# Patient Record
Sex: FEMALE | Race: WHITE | NOT HISPANIC OR LATINO
[De-identification: names, ages, dates, MRNs, and addresses within clinical notes are randomized per-mention and may not be internally consistent; named-entity substitution may affect disease eponyms.]

---

## 2017-02-07 ENCOUNTER — RX RENEWAL (OUTPATIENT)
Age: 42
End: 2017-02-07

## 2017-04-18 ENCOUNTER — OTHER (OUTPATIENT)
Age: 42
End: 2017-04-18

## 2017-05-01 ENCOUNTER — RX RENEWAL (OUTPATIENT)
Age: 42
End: 2017-05-01

## 2017-07-11 ENCOUNTER — RX RENEWAL (OUTPATIENT)
Age: 42
End: 2017-07-11

## 2017-09-18 ENCOUNTER — RX RENEWAL (OUTPATIENT)
Age: 42
End: 2017-09-18

## 2017-11-16 ENCOUNTER — APPOINTMENT (OUTPATIENT)
Dept: OBGYN | Facility: CLINIC | Age: 42
End: 2017-11-16

## 2017-12-11 ENCOUNTER — RX RENEWAL (OUTPATIENT)
Age: 42
End: 2017-12-11

## 2018-01-08 ENCOUNTER — RX RENEWAL (OUTPATIENT)
Age: 43
End: 2018-01-08

## 2018-02-23 ENCOUNTER — APPOINTMENT (OUTPATIENT)
Dept: OBGYN | Facility: CLINIC | Age: 43
End: 2018-02-23
Payer: COMMERCIAL

## 2018-02-23 VITALS
DIASTOLIC BLOOD PRESSURE: 80 MMHG | WEIGHT: 210 LBS | HEIGHT: 62 IN | BODY MASS INDEX: 38.64 KG/M2 | SYSTOLIC BLOOD PRESSURE: 130 MMHG

## 2018-02-23 PROCEDURE — 99396 PREV VISIT EST AGE 40-64: CPT

## 2018-02-25 LAB — HPV HIGH+LOW RISK DNA PNL CVX: NOT DETECTED

## 2018-03-01 LAB — CYTOLOGY CVX/VAG DOC THIN PREP: NORMAL

## 2019-03-23 ENCOUNTER — EMERGENCY (EMERGENCY)
Facility: HOSPITAL | Age: 44
LOS: 1 days | Discharge: HOME | End: 2019-03-23
Attending: EMERGENCY MEDICINE | Admitting: EMERGENCY MEDICINE
Payer: COMMERCIAL

## 2019-03-23 VITALS
TEMPERATURE: 98 F | WEIGHT: 136.03 LBS | HEART RATE: 100 BPM | RESPIRATION RATE: 17 BRPM | HEIGHT: 62 IN | OXYGEN SATURATION: 100 % | DIASTOLIC BLOOD PRESSURE: 76 MMHG | SYSTOLIC BLOOD PRESSURE: 120 MMHG

## 2019-03-23 VITALS
RESPIRATION RATE: 14 BRPM | DIASTOLIC BLOOD PRESSURE: 76 MMHG | HEART RATE: 89 BPM | TEMPERATURE: 98 F | OXYGEN SATURATION: 100 % | SYSTOLIC BLOOD PRESSURE: 111 MMHG

## 2019-03-23 DIAGNOSIS — F32.81 PREMENSTRUAL DYSPHORIC DISORDER: ICD-10-CM

## 2019-03-23 DIAGNOSIS — F41.9 ANXIETY DISORDER, UNSPECIFIED: ICD-10-CM

## 2019-03-23 LAB
ANION GAP SERPL CALC-SCNC: 11 MMOL/L — SIGNIFICANT CHANGE UP (ref 5–17)
APPEARANCE UR: CLEAR — SIGNIFICANT CHANGE UP
APPEARANCE UR: CLEAR — SIGNIFICANT CHANGE UP
BASOPHILS # BLD AUTO: 0.03 K/UL — SIGNIFICANT CHANGE UP (ref 0–0.2)
BASOPHILS NFR BLD AUTO: 0.5 % — SIGNIFICANT CHANGE UP (ref 0–2)
BILIRUB UR-MCNC: NEGATIVE — SIGNIFICANT CHANGE UP
BILIRUB UR-MCNC: NEGATIVE — SIGNIFICANT CHANGE UP
BUN SERPL-MCNC: 11 MG/DL — SIGNIFICANT CHANGE UP (ref 7–23)
CALCIUM SERPL-MCNC: 9.3 MG/DL — SIGNIFICANT CHANGE UP (ref 8.4–10.5)
CHLORIDE SERPL-SCNC: 104 MMOL/L — SIGNIFICANT CHANGE UP (ref 96–108)
CO2 SERPL-SCNC: 26 MMOL/L — SIGNIFICANT CHANGE UP (ref 22–31)
COLOR SPEC: SIGNIFICANT CHANGE UP
COLOR SPEC: YELLOW — SIGNIFICANT CHANGE UP
CREAT SERPL-MCNC: 0.91 MG/DL — SIGNIFICANT CHANGE UP (ref 0.5–1.3)
DIFF PNL FLD: ABNORMAL
DIFF PNL FLD: NEGATIVE — SIGNIFICANT CHANGE UP
EOSINOPHIL # BLD AUTO: 0.02 K/UL — SIGNIFICANT CHANGE UP (ref 0–0.5)
EOSINOPHIL NFR BLD AUTO: 0.3 % — SIGNIFICANT CHANGE UP (ref 0–6)
GLUCOSE SERPL-MCNC: 113 MG/DL — HIGH (ref 70–99)
GLUCOSE UR QL: NEGATIVE MG/DL — SIGNIFICANT CHANGE UP
GLUCOSE UR QL: NEGATIVE MG/DL — SIGNIFICANT CHANGE UP
HCT VFR BLD CALC: 39.6 % — SIGNIFICANT CHANGE UP (ref 34.5–45)
HGB BLD-MCNC: 13.9 G/DL — SIGNIFICANT CHANGE UP (ref 11.5–15.5)
IMM GRANULOCYTES NFR BLD AUTO: 0.2 % — SIGNIFICANT CHANGE UP (ref 0–1.5)
KETONES UR-MCNC: ABNORMAL
KETONES UR-MCNC: ABNORMAL
LEUKOCYTE ESTERASE UR-ACNC: ABNORMAL
LEUKOCYTE ESTERASE UR-ACNC: ABNORMAL
LYMPHOCYTES # BLD AUTO: 1.07 K/UL — SIGNIFICANT CHANGE UP (ref 1–3.3)
LYMPHOCYTES # BLD AUTO: 16.8 % — SIGNIFICANT CHANGE UP (ref 13–44)
MCHC RBC-ENTMCNC: 32.3 PG — SIGNIFICANT CHANGE UP (ref 27–34)
MCHC RBC-ENTMCNC: 35.1 GM/DL — SIGNIFICANT CHANGE UP (ref 32–36)
MCV RBC AUTO: 92.1 FL — SIGNIFICANT CHANGE UP (ref 80–100)
MONOCYTES # BLD AUTO: 0.51 K/UL — SIGNIFICANT CHANGE UP (ref 0–0.9)
MONOCYTES NFR BLD AUTO: 8 % — SIGNIFICANT CHANGE UP (ref 2–14)
NEUTROPHILS # BLD AUTO: 4.73 K/UL — SIGNIFICANT CHANGE UP (ref 1.8–7.4)
NEUTROPHILS NFR BLD AUTO: 74.2 % — SIGNIFICANT CHANGE UP (ref 43–77)
NITRITE UR-MCNC: NEGATIVE — SIGNIFICANT CHANGE UP
NITRITE UR-MCNC: NEGATIVE — SIGNIFICANT CHANGE UP
NRBC # BLD: 0 /100 WBCS — SIGNIFICANT CHANGE UP (ref 0–0)
PCP SPEC-MCNC: SIGNIFICANT CHANGE UP
PH UR: 5 — SIGNIFICANT CHANGE UP (ref 5–8)
PH UR: 7 — SIGNIFICANT CHANGE UP (ref 5–8)
PLATELET # BLD AUTO: 241 K/UL — SIGNIFICANT CHANGE UP (ref 150–400)
POTASSIUM SERPL-MCNC: 4 MMOL/L — SIGNIFICANT CHANGE UP (ref 3.5–5.3)
POTASSIUM SERPL-SCNC: 4 MMOL/L — SIGNIFICANT CHANGE UP (ref 3.5–5.3)
PROT UR-MCNC: 30 MG/DL
PROT UR-MCNC: NEGATIVE MG/DL — SIGNIFICANT CHANGE UP
RBC # BLD: 4.3 M/UL — SIGNIFICANT CHANGE UP (ref 3.8–5.2)
RBC # FLD: 11.6 % — SIGNIFICANT CHANGE UP (ref 10.3–14.5)
SODIUM SERPL-SCNC: 141 MMOL/L — SIGNIFICANT CHANGE UP (ref 135–145)
SP GR SPEC: 1 — LOW (ref 1.01–1.02)
SP GR SPEC: 1.02 — SIGNIFICANT CHANGE UP (ref 1.01–1.02)
UROBILINOGEN FLD QL: 1 MG/DL
UROBILINOGEN FLD QL: NEGATIVE MG/DL — SIGNIFICANT CHANGE UP
WBC # BLD: 6.37 K/UL — SIGNIFICANT CHANGE UP (ref 3.8–10.5)
WBC # FLD AUTO: 6.37 K/UL — SIGNIFICANT CHANGE UP (ref 3.8–10.5)

## 2019-03-23 PROCEDURE — 87086 URINE CULTURE/COLONY COUNT: CPT

## 2019-03-23 PROCEDURE — 85027 COMPLETE CBC AUTOMATED: CPT

## 2019-03-23 PROCEDURE — 99284 EMERGENCY DEPT VISIT MOD MDM: CPT

## 2019-03-23 PROCEDURE — 80307 DRUG TEST PRSMV CHEM ANLYZR: CPT

## 2019-03-23 PROCEDURE — 93005 ELECTROCARDIOGRAM TRACING: CPT

## 2019-03-23 PROCEDURE — 81001 URINALYSIS AUTO W/SCOPE: CPT

## 2019-03-23 PROCEDURE — 93010 ELECTROCARDIOGRAM REPORT: CPT

## 2019-03-23 PROCEDURE — 82607 VITAMIN B-12: CPT

## 2019-03-23 PROCEDURE — 36415 COLL VENOUS BLD VENIPUNCTURE: CPT

## 2019-03-23 PROCEDURE — 84443 ASSAY THYROID STIM HORMONE: CPT

## 2019-03-23 PROCEDURE — 80048 BASIC METABOLIC PNL TOTAL CA: CPT

## 2019-03-23 PROCEDURE — 99283 EMERGENCY DEPT VISIT LOW MDM: CPT

## 2019-03-23 RX ORDER — ALPRAZOLAM 0.25 MG
1 TABLET ORAL
Qty: 0 | Refills: 0 | COMMUNITY

## 2019-03-23 RX ORDER — FLUOXETINE HCL 10 MG
1 CAPSULE ORAL
Qty: 0 | Refills: 0 | COMMUNITY

## 2019-03-23 RX ADMIN — Medication 1 MILLIGRAM(S): at 11:20

## 2019-03-23 NOTE — ED PROVIDER NOTE - ATTENDING CONTRIBUTION TO CARE
I, Dr Panda, have personally performed a face to face diagnostic evaluation on this patient with the PA/NP. I have reviewed the PA/NP's note and agree with the history, Physical exam and plan of care as per history pt is a 42yo female with pmhx of PMDD and anxiety c/o anxiety x weeks. pt reports increased anxiety and worry for week. pt went to pmd monday, dr craft, who rx prozac and xanax which has provided minimal relief. pt took xanax 2 times yesterday 0.25mg and once today 6am. pt denies fever, cp, sob, palpitations, suicidal ideation, homicidal ideations, hallucinations, delusions. on exam unremakable advice to fallow up with out patient mental clinic.

## 2019-03-23 NOTE — ED PROVIDER NOTE - NSFOLLOWUPINSTRUCTIONS_ED_ALL_ED_FT
1. FOLLOW UP WITH YOUR PRIMARY DOCTOR IN 24-48 HOURS.   2. FOLLOW UP WITH ALL SPECIALIST DISCUSSED DURING YOUR VISIT.   3. TAKE ALL MEDICATIONS PRESCRIBED IN THE ER IF ANY ARE PRESCRIBED. CONTINUE YOUR HOME MEDICATIONS UNLESS OTHERWISE ADVISED DIFFERENTLY.   4. RETURN FOR WORSENING SYMPTOMS OR CONCERNS INCLUDING BUT NOT LIMITED TO FEVER, CHEST PAIN, OR TROUBLE BREATHING OR ANY OTHER CONCERNS  5. continue all home medications

## 2019-03-23 NOTE — ED PROVIDER NOTE - CHPI ED SYMPTOMS NEG
no homicidal/no weakness/no agitation/no disorientation/no hallucinations/no paranoia/no suicidal/no change in level of consciousness

## 2019-03-23 NOTE — ED PROVIDER NOTE - PROGRESS NOTE DETAILS
consulted vaishali at [facility] who advised pt can come to be evaluated during walkin hours tuesday 9-11am and have psych eval. discussed with pt who agrees. will re-eval labs. family requested psych consult. consulted [facility] who advised no need for psych consult at this time. pt is not SI or homicidal. pt advised she can follow up out pt. resources given to pt from [facility] family requested psych consult. consulted [facility] who advised no need for psych consult at this time. pt is not SI or homicidal. pt advised she can follow up out pt. resources given to pt from [facility]. will repeat urine as urine was not clean catch. pt improved. pt advised to follow up with [facility]. ua few wbc, pt without dysuria. will send culture. All imaging and labs reviewed. all results reviewed with pt including abnormal results. pt given a copy of results. pt advised to follow up with pmd regarding abnormal results. All questions answered and concerns addressed. pt verbalized understanding and agreement with plan and dx. pt advised on next step and when/where to follow up. pt advised on all take home and otc medications. pt advised to follow up with PMD. pt advised to return to ed for worsenng symptoms including fever, cp, sob. will dc.

## 2019-03-23 NOTE — ED PROVIDER NOTE - NSFOLLOWUPCLINICS_GEN_ALL_ED_FT
[facility]  Psychiatry  [address] [place] [address]  Phone: [phone]  Fax:   Follow Up Time:     [facility]  Psychiatry  [address] [place] [address]  Phone: [phone]  Fax:   Follow Up Time:

## 2019-03-23 NOTE — ED PROVIDER NOTE - CLINICAL SUMMARY MEDICAL DECISION MAKING FREE TEXT BOX
pt with increased anxiety over weeks, no suicidal or homicidal ideations, no hallucinations or delusions. pt started on xanax and prozac by pmd dr craft for <1 week. will do labs, tsh to r/o hypo/hyperthyroid, b12, ekg, ua, drug screen. pt advised she needs full labs work for vitamin levels, etc by pmd. will give ativan in ed. will re-eval

## 2019-03-23 NOTE — ED PROVIDER NOTE - OBJECTIVE STATEMENT
pt is a 44yo female with pmhx of PMDD and anxiety c/o anxiety x weeks. pt reports increased anxiety and worry for week. pt went to pmd monday, dr craft, who rx prozac and xanax which has provided minimal relief. pt took xanax 2 times yesterday 0.25mg and once today 6am. pt denies fever, cp, sob, palpitations, suicidal ideation, homicidal ideations, hallucinations, delusions.

## 2019-03-23 NOTE — ED ADULT TRIAGE NOTE - CHIEF COMPLAINT QUOTE
wants to talk to psychiatrist  feels depressed or anxious not sure which saw pmd and started on prozac

## 2019-03-23 NOTE — ED ADULT NURSE NOTE - OBJECTIVE STATEMENT
need to talk to a psychiatrist, primary physician sees patient for anxiety prescribed Xanax and Prozac

## 2019-03-24 LAB
CULTURE RESULTS: NO GROWTH — SIGNIFICANT CHANGE UP
SPECIMEN SOURCE: SIGNIFICANT CHANGE UP
TSH SERPL-MCNC: 1.24 UIU/ML — SIGNIFICANT CHANGE UP (ref 0.27–4.2)
VIT B12 SERPL-MCNC: 493 PG/ML — SIGNIFICANT CHANGE UP (ref 232–1245)

## 2019-03-25 PROBLEM — F32.81 PREMENSTRUAL DYSPHORIC DISORDER: Chronic | Status: ACTIVE | Noted: 2019-03-23

## 2019-03-25 PROBLEM — F41.9 ANXIETY DISORDER, UNSPECIFIED: Chronic | Status: ACTIVE | Noted: 2019-03-23

## 2019-03-25 NOTE — ED ADULT NURSE NOTE - NSFALLRSKASSESSDT_ED_ALL_ED
Patient ID: Nena Valenzuela is a 81year old female. Chief Complaint   Patient presents with   â¢ Follow-up       HPI    Takes vasotec twice a day    no chest pain or sob   mild ankle edema on andoff  Knees are stable  Take multivit occ  Only problem is stiff knees  Last eye exam was 2 years ago    PAST MEDICAL HX:    Arthritis                                                     Essential (primary) hypertension                              ALLERGIES:  No Known Allergies    Current Outpatient Medications   Medication Sig Dispense Refill   â¢ hydrochlorothiazide (HYDRODIURIL) 25 MG tablet One tablet per day as needed for ankle swelling 30 tablet 1   â¢ enalapril (VASOTEC) 5 MG tablet Take 1 tablet by mouth every 12 hours. TAKE 1 TABLET Every twelve hours 180 tablet 3     No current facility-administered medications for this visit. PAST SURGICAL HX:    HYSTERECTOMY                                                Comment: fibroids    History reviewed. No pertinent family history. Review of patient's family status indicates: Mother                                             Comment: pneumonia    Father                                             Comment: lived to age 80,alcoholic    Sister                                             Comment: 2  of unknown cause, still 4 sister                alive    Brother                                            Comment: 2  form alcoohol    Daughter                       Alive                       Social History     Socioeconomic History   â¢ Marital status:       Spouse name: Not on file   â¢ Number of children: Not on file   â¢ Years of education: Not on file   â¢ Highest education level: Not on file   Social Needs   â¢ Financial resource strain: Not on file   â¢ Food insecurity - worry: Not on file   â¢ Food insecurity - inability: Not on file   â¢ Transportation needs - medical: Not on file   â¢ Transportation needs - non-medical: Not on file   Occupational History   â¢ Not on file   Tobacco Use   â¢ Smoking status: Never Smoker   â¢ Smokeless tobacco: Never Used   Substance and Sexual Activity   â¢ Alcohol use: No     Frequency: Never   â¢ Drug use: No   â¢ Sexual activity: Not on file   Other Topics Concern   â¢ Not on file   Social History Narrative   â¢ Not on file       Review of Systems   All other systems reviewed and are negative. Physical Exam   Constitutional: She is oriented to person, place, and time. She appears well-developed and well-nourished. Neck: Normal range of motion. Cardiovascular:   Mild ankle edema  And 1 + pedal pulse   Pulmonary/Chest: Effort normal and breath sounds normal.   Musculoskeletal:   Decreased ROM of knees   Neurological: She is alert and oriented to person, place, and time. She has normal reflexes. Skin: Skin is warm and dry.        No results found for: WBC  No results found for: RBC  No results found for: HCT  No results found for: HGB  No results found for: PLT  Sodium (mmol/L)   Date Value   09/15/2018 142     Potassium (mmol/L)   Date Value   09/15/2018 4.2     Chloride (mmol/L)   Date Value   09/15/2018 107     Glucose (mg/dl)   Date Value   09/15/2018 87     CALCIUM (mg/dl)   Date Value   09/15/2018 8.7     Carbon Dioxide (mmol/L)   Date Value   09/15/2018 27     BUN (mg/dl)   Date Value   09/15/2018 9     Creatinine (mg/dl)   Date Value   09/15/2018 0.92     Hemoglobin A1C (%)   Date Value   10/19/2018 5.9 (H)     CHOLESTEROL (mg/dl)   Date Value   09/15/2018 222 (H)     HDL (mg/dl)   Date Value   09/15/2018 64     CHOL/HDL ( )   Date Value   09/15/2018 3.5     TRIGLYCERIDE (mg/dl)   Date Value   09/15/2018 87     CALCULATED LDL (mg/dl)   Date Value   09/15/2018 141 (H)       Health Maintenance Summary     Topic Due On Due Status Completed On    Osteoporosis Screening Feb 10, 2001 [facility] Visit Feb 10, 2001 Overdue     IMMUNIZATION - DTaP/Tdap/Td Feb 10, 1955 Overdue     Immunization-Influenza Sep 1, 2018 Overdue     Depression Screening Feb 10, 1948 Overdue     Pneumococcal Vaccine 65+ Low/Medium Risk Feb 10, 2001 Overdue     Immunization - Shingles Feb 10, 1986 Overdue     Immunization - MMR  Hidden         Assessment   Problem List Items Addressed This Visit        Circulatory    Benign essential hypertension - Primary     Hypertension is improving with treatment. Continue current treatment regimen.   Blood pressure will be reassessed in 6 months         Relevant Medications    hydrochlorothiazide (HYDRODIURIL) 25 MG tablet    enalapril (VASOTEC) 5 MG tablet    Other Relevant Orders    COMPREHENSIVE METABOLIC PANEL    LIPID PANEL WITH REFLEX    CBC & AUTO DIFFERENTIAL              She Benavidez MD 23-Mar-2019 10:22

## 2019-03-26 ENCOUNTER — APPOINTMENT (OUTPATIENT)
Dept: OBGYN | Facility: CLINIC | Age: 44
End: 2019-03-26
Payer: COMMERCIAL

## 2019-03-26 VITALS — HEIGHT: 62 IN | SYSTOLIC BLOOD PRESSURE: 110 MMHG | DIASTOLIC BLOOD PRESSURE: 80 MMHG

## 2019-03-26 PROCEDURE — 99213 OFFICE O/P EST LOW 20 MIN: CPT

## 2019-03-26 NOTE — CHIEF COMPLAINT
[FreeTextEntry1] : 44YO P2 LMP 6 weeks ago, noting menses coming less frequently. Pt also with Hx of OCD and depression/anxiety. She had a major episode 10yrs ago while trying to conceive and feels now like she is slipping back into major depression. She does NOT want to do harm to herself or others. She was seen in ER and advised she does not need psych admission. She was started on Prozac 20qD 6 d ago and has felt worse since starting it. I advised her to D/C it yesterday. She is also concerned that her irregular cycles are affecting her hormones and therefore her psyche. I advise to restart prozac 10qD (as that is what worked 10 yrs ago). I will call her in 3d to see how she's doing and consider low dose OCP to control cycle and moods next week.

## 2019-03-27 LAB
ESTRADIOL SERPL-MCNC: <5 PG/ML
FSH SERPL-MCNC: 105 IU/L
PROGEST SERPL-MCNC: 0.3 NG/ML

## 2019-04-15 ENCOUNTER — APPOINTMENT (OUTPATIENT)
Dept: OBGYN | Facility: CLINIC | Age: 44
End: 2019-04-15
Payer: COMMERCIAL

## 2019-04-15 VITALS
DIASTOLIC BLOOD PRESSURE: 77 MMHG | HEIGHT: 62 IN | BODY MASS INDEX: 24.66 KG/M2 | SYSTOLIC BLOOD PRESSURE: 120 MMHG | WEIGHT: 134 LBS

## 2019-04-15 PROCEDURE — 99396 PREV VISIT EST AGE 40-64: CPT

## 2019-04-15 NOTE — HISTORY OF PRESENT ILLNESS
[1 Year Ago] : 1 year ago [Good] : being in good health [Healthy Diet] : a healthy diet [Regular Exercise] : regular exercise [Last Mammogram ___] : Last Mammogram was [unfilled] [Last Pap ___] : Last cervical pap smear was [unfilled] [Perimenopausal] : is perimenopausal [Pregnancy History] : pregnancy history: [Up to Date] : up to date with ~his/her~ STD screening [Weight Concerns] : no concerns with her weight [Menstrual Problems] : reports normal menses

## 2019-04-15 NOTE — PHYSICAL EXAM
[Awake] : awake [Acute Distress] : no acute distress [Alert] : alert [LAD] : no lymphadenopathy [Thyroid Nodule] : no thyroid nodule [Mass] : no breast mass [Goiter] : no goiter [Axillary LAD] : no axillary lymphadenopathy [Nipple Discharge] : no nipple discharge [Soft] : soft [Tender] : non tender [Distended] : not distended [Oriented x3] : oriented to person, place, and time [H/Smegaly] : no hepatosplenomegaly [Depressed Mood] : not depressed [Flat Affect] : affect not flat [No Bleeding] : there was no active vaginal bleeding [Normal] : uterus [Anteversion] : anteverted [Tenderness] : nontender [RRR, No Murmurs] : RRR, no murmurs [Uterine Adnexae] : were not tender and not enlarged [CTAB] : CTAB

## 2019-04-15 NOTE — CHIEF COMPLAINT
[Annual Visit] : annual visit [FreeTextEntry1] : 42YO P2 LMP 2/20 on Aviane and Prozac for perimenopausal mood issues, feeling slightly improved.

## 2019-04-16 LAB — HPV HIGH+LOW RISK DNA PNL CVX: NOT DETECTED

## 2019-04-19 LAB — CYTOLOGY CVX/VAG DOC THIN PREP: NORMAL

## 2019-08-06 ENCOUNTER — OTHER (OUTPATIENT)
Age: 44
End: 2019-08-06

## 2019-08-15 ENCOUNTER — CHART COPY (OUTPATIENT)
Age: 44
End: 2019-08-15

## 2020-01-30 ENCOUNTER — RX RENEWAL (OUTPATIENT)
Age: 45
End: 2020-01-30

## 2020-05-22 ENCOUNTER — APPOINTMENT (OUTPATIENT)
Dept: OBGYN | Facility: CLINIC | Age: 45
End: 2020-05-22
Payer: COMMERCIAL

## 2020-05-22 VITALS
DIASTOLIC BLOOD PRESSURE: 82 MMHG | HEART RATE: 88 BPM | WEIGHT: 150 LBS | SYSTOLIC BLOOD PRESSURE: 132 MMHG | HEIGHT: 62 IN | BODY MASS INDEX: 27.6 KG/M2

## 2020-05-22 DIAGNOSIS — E28.39 OTHER PRIMARY OVARIAN FAILURE: ICD-10-CM

## 2020-05-22 PROCEDURE — 99396 PREV VISIT EST AGE 40-64: CPT

## 2020-05-22 NOTE — HISTORY OF PRESENT ILLNESS
[1 Year Ago] : 1 year ago [Good] : being in good health [Healthy Diet] : a healthy diet [Regular Exercise] : regular exercise [Last Mammogram ___] : Last Mammogram was [unfilled] [Last Pap ___] : Last cervical pap smear was [unfilled] [Contraception] : uses contraception [Reproductive Age] : is of reproductive age [Pregnancy History] : pregnancy history: [Sexually Active] : is sexually active [Up to Date] : up to date with ~his/her~ STD screening [Weight Concerns] : no concerns with her weight [Menstrual Problems] : reports normal menses

## 2020-05-22 NOTE — CHIEF COMPLAINT
[Annual Visit] : annual visit [FreeTextEntry1] : 45YO P2 LMP 5/17 on Aviane and Prozac presents for checkup without complaints.

## 2020-05-22 NOTE — PHYSICAL EXAM
[Awake] : awake [Alert] : alert [Acute Distress] : no acute distress [LAD] : no lymphadenopathy [Thyroid Nodule] : no thyroid nodule [Mass] : no breast mass [Goiter] : no goiter [Nipple Discharge] : no nipple discharge [Axillary LAD] : no axillary lymphadenopathy [Soft] : soft [Tender] : non tender [Distended] : not distended [H/Smegaly] : no hepatosplenomegaly [Oriented x3] : oriented to person, place, and time [Depressed Mood] : not depressed [Flat Affect] : affect not flat [Normal] : uterus [No Bleeding] : there was no active vaginal bleeding [Anteversion] : anteverted [Tenderness] : nontender [Uterine Adnexae] : were not tender and not enlarged [RRR, No Murmurs] : RRR, no murmurs [CTAB] : CTAB

## 2020-05-26 LAB — HPV HIGH+LOW RISK DNA PNL CVX: NOT DETECTED

## 2020-05-28 LAB — CYTOLOGY CVX/VAG DOC THIN PREP: NORMAL

## 2020-09-24 ENCOUNTER — RECORD ABSTRACTING (OUTPATIENT)
Age: 45
End: 2020-09-24

## 2020-09-24 DIAGNOSIS — H92.09 OTALGIA, UNSPECIFIED EAR: ICD-10-CM

## 2020-09-24 DIAGNOSIS — Z86.69 PERSONAL HISTORY OF OTHER DISEASES OF THE NERVOUS SYSTEM AND SENSE ORGANS: ICD-10-CM

## 2020-09-24 DIAGNOSIS — H61.22 IMPACTED CERUMEN, LEFT EAR: ICD-10-CM

## 2020-09-24 DIAGNOSIS — H93.293 OTHER ABNORMAL AUDITORY PERCEPTIONS, BILATERAL: ICD-10-CM

## 2020-09-24 DIAGNOSIS — R42 DIZZINESS AND GIDDINESS: ICD-10-CM

## 2020-09-24 DIAGNOSIS — H69.83 OTHER SPECIFIED DISORDERS OF EUSTACHIAN TUBE, BILATERAL: ICD-10-CM

## 2020-09-24 RX ORDER — FLUOXETINE HYDROCHLORIDE 40 MG/1
40 CAPSULE ORAL
Refills: 0 | Status: ACTIVE | COMMUNITY

## 2020-10-05 ENCOUNTER — APPOINTMENT (OUTPATIENT)
Dept: OTOLARYNGOLOGY | Facility: CLINIC | Age: 45
End: 2020-10-05
Payer: COMMERCIAL

## 2020-10-05 VITALS
WEIGHT: 140 LBS | DIASTOLIC BLOOD PRESSURE: 78 MMHG | SYSTOLIC BLOOD PRESSURE: 124 MMHG | BODY MASS INDEX: 25.76 KG/M2 | TEMPERATURE: 97.8 F | HEIGHT: 62 IN

## 2020-10-05 PROCEDURE — 99213 OFFICE O/P EST LOW 20 MIN: CPT | Mod: 25

## 2020-10-05 PROCEDURE — 31575 DIAGNOSTIC LARYNGOSCOPY: CPT

## 2020-10-05 RX ORDER — LEVONORGESTREL AND ETHINYL ESTRADIOL 0.1-0.02MG
0.1-2 KIT ORAL
Qty: 3 | Refills: 3 | Status: DISCONTINUED | COMMUNITY
Start: 2019-03-26 | End: 2020-10-05

## 2020-10-05 RX ORDER — CLONAZEPAM 0.25 MG/1
0.25 TABLET, ORALLY DISINTEGRATING ORAL
Refills: 0 | Status: DISCONTINUED | COMMUNITY
Start: 2019-04-15 | End: 2020-10-05

## 2020-10-05 RX ORDER — CIPROFLOXACIN AND DEXAMETHASONE 3; 1 MG/ML; MG/ML
0.3-0.1 SUSPENSION/ DROPS AURICULAR (OTIC)
Refills: 0 | Status: DISCONTINUED | COMMUNITY
End: 2020-10-05

## 2020-10-05 NOTE — ADDENDUM
[FreeTextEntry1] : Documented by Lauren Tesfaye acting as scribe for Dr. Villalpando on 10/05/2020.\par \par All Medical record entries made by the Scribe were at my, Dr. Villalpando, direction and personally dictated by me on 10/05/2020 . I have reviewed the chart and agree that the record accurately reflects my personal performance of the history, physical exam, assessment and plan. I have also personally directed, reviewed, and agreed with the discharge instructions.

## 2020-10-05 NOTE — HISTORY OF PRESENT ILLNESS
[de-identified] : The patient presents with h/o thyroiditis, thyroid nodularity and dysphagia. Pt states that one month ago she had a globus sensation on the right side when swallowing which has since resolved. Pt also reports that when she showers the water doesn't come out of her left ear.

## 2020-10-05 NOTE — PHYSICAL EXAM
[Nodule] : nodule [Midline] : trachea located in midline position [Laryngoscopy Performed] : laryngoscopy was performed, see procedure section for findings [Normal] : no rashes [FreeTextEntry8] : minimal cerumen removed via curettage [FreeTextEntry9] : large plug of cerumen removed via curettage, lip in the canal [FreeTextEntry1] : midline septum, minimally inflamed turbinates [FreeTextEntry2] : sinuses nontender to percussion

## 2020-10-05 NOTE — REVIEW OF SYSTEMS
[Ear Pain] : ear pain [Ear Itch] : ear itch [Swelling Neck] : swelling neck [Eye Pain] : eye pain [Swollen Glands In The Neck] : swollen glands in the neck [Negative] : Endocrine

## 2020-10-05 NOTE — CONSULT LETTER
[Courtesy Letter:] : I had the pleasure of seeing your patient, [unfilled], in my office today. [Please see my note below.] : Please see my note below. [Consult Closing:] : Thank you very much for allowing me to participate in the care of this patient.  If you have any questions, please do not hesitate to contact me. [Sincerely,] : Sincerely, [Dear  ___] : Dear  [unfilled], [FreeTextEntry3] : Arash Villalpando MD FACS

## 2020-10-05 NOTE — PROCEDURE
[de-identified] : Procedure:  Flexible Fiberoptic Laryngoscopy: Risks, benefits, and alternatives of flexible endoscopy were explained to the patient.  Specific mention was made of the risk of throat numbness.  The patient gave oral consent to proceed.  The nasal cavities were decongested and anesthetized with a combination of oxymetazoline and 4% lidocaine solution.  The flexible scope was inserted into the right nasal cavity and advanced towards the nasopharynx.  Visualized mucosa over the turbinates and septum were as described above.  The nasopharynx had mild erythema. Oropharyngeal walls were symmetric and mobile without lesion, mass, or edema.  Hypopharynx was also without  lesion or edema.  Larynx was mobile without lesions. Supraglottic structures were free of edema, mass, and asymmetry.  True vocal folds were white without mass or lesion. Right VC is not moving. Base of tongue was within normal limits.

## 2020-10-05 NOTE — ASSESSMENT
[FreeTextEntry1] : h/o thyroiditis, thyroid nodularity and dysphagia\par globus sensation with swallowing\par right VC is not moving\par \par Pt felt dizzy upon removal of cerumen. She was treated with smelling salts and cool compresses and laid supine in the exam chair until feeling better. BP taken at 78/56. \par \par US thyroid 7/1/2020 : small nodules left upper pole 0.5 x 0.5 cm, stable form 12/2019\par \par US soft tissue neck 7/15/2020: benign appearing lymph nodes level 2 and level 5 right neck, level 2 and level 4 left neck\par \par Plan:\par Reviewed imaging results. Cerumen removed. Flexible laryngoscopy. CT neck. Videostrobe. FEEST. FU after tests.

## 2020-10-07 ENCOUNTER — TRANSCRIPTION ENCOUNTER (OUTPATIENT)
Age: 45
End: 2020-10-07

## 2020-10-09 ENCOUNTER — OUTPATIENT (OUTPATIENT)
Dept: OUTPATIENT SERVICES | Facility: HOSPITAL | Age: 45
LOS: 1 days | End: 2020-10-09
Payer: COMMERCIAL

## 2020-10-09 ENCOUNTER — APPOINTMENT (OUTPATIENT)
Dept: CT IMAGING | Facility: CLINIC | Age: 45
End: 2020-10-09
Payer: COMMERCIAL

## 2020-10-09 DIAGNOSIS — J38.00 PARALYSIS OF VOCAL CORDS AND LARYNX, UNSPECIFIED: ICD-10-CM

## 2020-10-09 PROCEDURE — 70491 CT SOFT TISSUE NECK W/DYE: CPT | Mod: 26

## 2020-10-09 PROCEDURE — 70491 CT SOFT TISSUE NECK W/DYE: CPT

## 2020-10-12 ENCOUNTER — APPOINTMENT (OUTPATIENT)
Dept: OTOLARYNGOLOGY | Facility: CLINIC | Age: 45
End: 2020-10-12
Payer: COMMERCIAL

## 2020-10-12 PROCEDURE — 92612 ENDOSCOPY SWALLOW (FEES) VID: CPT | Mod: GN

## 2020-10-12 PROCEDURE — 31579 LARYNGOSCOPY TELESCOPIC: CPT | Mod: GN

## 2020-10-16 ENCOUNTER — APPOINTMENT (OUTPATIENT)
Dept: OTOLARYNGOLOGY | Facility: CLINIC | Age: 45
End: 2020-10-16
Payer: COMMERCIAL

## 2020-10-16 VITALS
SYSTOLIC BLOOD PRESSURE: 123 MMHG | DIASTOLIC BLOOD PRESSURE: 79 MMHG | HEIGHT: 62 IN | BODY MASS INDEX: 25.76 KG/M2 | WEIGHT: 140 LBS

## 2020-10-16 PROCEDURE — 99214 OFFICE O/P EST MOD 30 MIN: CPT

## 2020-10-16 PROCEDURE — 92507 TX SP LANG VOICE COMM INDIV: CPT | Mod: GN

## 2020-10-16 NOTE — ADDENDUM
[FreeTextEntry1] : Documented by Lauren Tesfaye acting as scribe for Dr. Villalpando on 10/16/2020.\par \par All Medical record entries made by the Scribe were at my, Dr. Villalpando, direction and personally dictated by me on 10/16/2020 . I have reviewed the chart and agree that the record accurately reflects my personal performance of the history, physical exam, assessment and plan. I have also personally directed, reviewed, and agreed with the discharge instructions. Patient complaining of abscess to labia of vagina x 3 days

## 2020-10-16 NOTE — HISTORY OF PRESENT ILLNESS
[de-identified] : The patient presents with h/o thyroiditis, thyroid nodularity, dysphagia and globus sensation with swallowing. Pt presents today for results. She has already started speech therapy.

## 2020-10-16 NOTE — ASSESSMENT
[FreeTextEntry1] : h/o thyroiditis, thyroid nodularity, dysphagia and globus sensation with swallowing\par \par CT soft tissue neck 10/9/2020: normal\par \par FEEST 10/12/2020: normal\par \par Videostrobe 10/12/2020: anterior posterior tension throughout voicing tasks bilaterally\par \par Plan:\par Reviewed test results. Speech therapy.\par \par

## 2020-10-23 ENCOUNTER — APPOINTMENT (OUTPATIENT)
Dept: OTOLARYNGOLOGY | Facility: CLINIC | Age: 45
End: 2020-10-23
Payer: COMMERCIAL

## 2020-10-23 PROCEDURE — 99072 ADDL SUPL MATRL&STAF TM PHE: CPT | Mod: GN

## 2020-10-23 PROCEDURE — 92507 TX SP LANG VOICE COMM INDIV: CPT | Mod: GN

## 2020-11-04 ENCOUNTER — APPOINTMENT (OUTPATIENT)
Dept: OTOLARYNGOLOGY | Facility: CLINIC | Age: 45
End: 2020-11-04
Payer: COMMERCIAL

## 2020-11-04 PROCEDURE — 92507 TX SP LANG VOICE COMM INDIV: CPT | Mod: GN

## 2020-11-04 PROCEDURE — 99072 ADDL SUPL MATRL&STAF TM PHE: CPT | Mod: GN

## 2020-11-16 ENCOUNTER — APPOINTMENT (OUTPATIENT)
Dept: OTOLARYNGOLOGY | Facility: CLINIC | Age: 45
End: 2020-11-16

## 2020-11-30 ENCOUNTER — APPOINTMENT (OUTPATIENT)
Dept: OTOLARYNGOLOGY | Facility: CLINIC | Age: 45
End: 2020-11-30
Payer: COMMERCIAL

## 2020-11-30 PROCEDURE — 99072 ADDL SUPL MATRL&STAF TM PHE: CPT

## 2020-11-30 PROCEDURE — 92507 TX SP LANG VOICE COMM INDIV: CPT | Mod: GN

## 2020-12-09 ENCOUNTER — APPOINTMENT (OUTPATIENT)
Dept: OTOLARYNGOLOGY | Facility: CLINIC | Age: 45
End: 2020-12-09
Payer: COMMERCIAL

## 2020-12-09 PROCEDURE — 99072 ADDL SUPL MATRL&STAF TM PHE: CPT

## 2020-12-09 PROCEDURE — 92507 TX SP LANG VOICE COMM INDIV: CPT | Mod: GN

## 2020-12-21 ENCOUNTER — APPOINTMENT (OUTPATIENT)
Dept: OTOLARYNGOLOGY | Facility: CLINIC | Age: 45
End: 2020-12-21
Payer: COMMERCIAL

## 2020-12-21 PROCEDURE — 92507 TX SP LANG VOICE COMM INDIV: CPT | Mod: GN

## 2020-12-21 PROCEDURE — 99072 ADDL SUPL MATRL&STAF TM PHE: CPT

## 2020-12-29 ENCOUNTER — LABORATORY RESULT (OUTPATIENT)
Age: 45
End: 2020-12-29

## 2021-01-18 ENCOUNTER — APPOINTMENT (OUTPATIENT)
Dept: OTOLARYNGOLOGY | Facility: CLINIC | Age: 46
End: 2021-01-18

## 2021-02-04 ENCOUNTER — NON-APPOINTMENT (OUTPATIENT)
Age: 46
End: 2021-02-04

## 2021-02-22 ENCOUNTER — NON-APPOINTMENT (OUTPATIENT)
Age: 46
End: 2021-02-22

## 2021-02-25 ENCOUNTER — APPOINTMENT (OUTPATIENT)
Dept: OTOLARYNGOLOGY | Facility: CLINIC | Age: 46
End: 2021-02-25
Payer: COMMERCIAL

## 2021-02-25 VITALS
DIASTOLIC BLOOD PRESSURE: 87 MMHG | HEART RATE: 86 BPM | WEIGHT: 140 LBS | HEIGHT: 62 IN | TEMPERATURE: 97.4 F | SYSTOLIC BLOOD PRESSURE: 133 MMHG | BODY MASS INDEX: 25.76 KG/M2

## 2021-02-25 PROCEDURE — 99214 OFFICE O/P EST MOD 30 MIN: CPT

## 2021-02-25 PROCEDURE — 99072 ADDL SUPL MATRL&STAF TM PHE: CPT

## 2021-02-25 NOTE — HISTORY OF PRESENT ILLNESS
[de-identified] : The patient presents with h/o thyroiditis, thyroid nodularity, dysphagia and globus sensation with swallowing. Pt has been going for speech and swallowing therapy with improvement. She had recent thyroid lab work done. Denies any neck pain.

## 2021-02-25 NOTE — ASSESSMENT
[FreeTextEntry1] : Reviewed and reconciled medications, allergies, PMHx, PSHx, SocHx, FMHx.\par \par h/o thyroiditis, thyroid nodularity, dysphagia and globus sensation with swallowing - improvement with speech therapy\par \par lab work 12/15/19: thyroid peroxidase antibody elevated\par \par US neck 7/2020: benign appearing cervical lymph nodes bilaterally\par \par US thyroid 7/2020: nonenlarged diffusely heterogeneous thyroid gland with evidence of a stable 0.5 cm upper pole left thyroid nodules, unchanged from the study on 12/13/19\par \par lab work 12/29/2020: peroxidase antibodies elevated, TSH nl, thyroglobulins normal, T4 nl, SED rate 6, free thyroxine nl, T3 uptake nl\par \par Plan:\par Reviewed lab work x2 and US x2 results. Repeat US thyroid and neck 7/2021. FU after test.

## 2021-02-25 NOTE — PHYSICAL EXAM
[Midline] : trachea located in midline position [Normal] : no rashes [Hearing Loss Right Only] : normal [Hearing Loss Left Only] : normal

## 2021-02-25 NOTE — ADDENDUM
[FreeTextEntry1] : Documented by Lauren Tesfaye acting as scribe for Dr. Villalpando on 02/25/2021.\par \par All Medical record entries made by the Scribe were at my, Dr. Villalpando, direction and personally dictated by me on 02/25/2021 . I have reviewed the chart and agree that the record accurately reflects my personal performance of the history, physical exam, assessment and plan. I have also personally directed, reviewed, and agreed with the discharge instructions.

## 2021-02-25 NOTE — CONSULT LETTER
[Dear  ___] : Dear  [unfilled], [Courtesy Letter:] : I had the pleasure of seeing your patient, [unfilled], in my office today. [Please see my note below.] : Please see my note below. [Consult Closing:] : Thank you very much for allowing me to participate in the care of this patient.  If you have any questions, please do not hesitate to contact me. [Sincerely,] : Sincerely, [FreeTextEntry3] : Arash Villalpando MD FACS

## 2021-06-07 ENCOUNTER — APPOINTMENT (OUTPATIENT)
Dept: OBGYN | Facility: CLINIC | Age: 46
End: 2021-06-07
Payer: COMMERCIAL

## 2021-06-07 VITALS
TEMPERATURE: 97.5 F | WEIGHT: 156 LBS | DIASTOLIC BLOOD PRESSURE: 60 MMHG | SYSTOLIC BLOOD PRESSURE: 126 MMHG | BODY MASS INDEX: 28.53 KG/M2

## 2021-06-07 DIAGNOSIS — J38.3 OTHER DISEASES OF VOCAL CORDS: ICD-10-CM

## 2021-06-07 DIAGNOSIS — R13.12 DYSPHAGIA, OROPHARYNGEAL PHASE: ICD-10-CM

## 2021-06-07 DIAGNOSIS — E04.1 NONTOXIC SINGLE THYROID NODULE: ICD-10-CM

## 2021-06-07 DIAGNOSIS — Z87.09 PERSONAL HISTORY OF OTHER DISEASES OF THE RESPIRATORY SYSTEM: ICD-10-CM

## 2021-06-07 PROCEDURE — 99396 PREV VISIT EST AGE 40-64: CPT

## 2021-06-07 PROCEDURE — 99072 ADDL SUPL MATRL&STAF TM PHE: CPT

## 2021-06-07 NOTE — HISTORY OF PRESENT ILLNESS
[Patient reported mammogram was normal] : Patient reported mammogram was normal [Patient reported breast sonogram was normal] : Patient reported breast sonogram was normal [Patient reported PAP Smear was normal] : Patient reported PAP Smear was normal [FreeTextEntry1] : 46YO P2 LMP 5/16 on Aviane for POF and Prozac presents for checkup without physical complaints. [Mammogramdate] : 8/20 [BreastSonogramDate] : 8/20 [PapSmeardate] : 5/20

## 2021-06-09 LAB — HPV HIGH+LOW RISK DNA PNL CVX: NOT DETECTED

## 2021-06-11 LAB — CYTOLOGY CVX/VAG DOC THIN PREP: NORMAL

## 2021-12-27 ENCOUNTER — APPOINTMENT (OUTPATIENT)
Dept: OTOLARYNGOLOGY | Facility: CLINIC | Age: 46
End: 2021-12-27
Payer: COMMERCIAL

## 2021-12-27 VITALS
BODY MASS INDEX: 28.71 KG/M2 | HEART RATE: 93 BPM | HEIGHT: 62 IN | SYSTOLIC BLOOD PRESSURE: 126 MMHG | WEIGHT: 156 LBS | DIASTOLIC BLOOD PRESSURE: 80 MMHG

## 2021-12-27 DIAGNOSIS — E04.2 NONTOXIC MULTINODULAR GOITER: ICD-10-CM

## 2021-12-27 PROCEDURE — 99214 OFFICE O/P EST MOD 30 MIN: CPT

## 2021-12-27 NOTE — ASSESSMENT
[FreeTextEntry1] : Reviewed and reconciled medications, allergies, PMHx, PSHx, SocHx, FMHx. \par \par h/o thyroiditis, thyroid nodularity, dysphagia and globus sensation with swallowing\par palpable thyroid nodule on right\par \par Blood work 6/2021: T4 normal at 1.18\par TSH normal\par Anti thyroid globulin normal\par Thyroid peroxidase is elevated at 90, consistent with thyroiditis\par White count normal\par \par US thyroid 6/30/21: right side with new nodule. left side with stable nodule. no lymph nodes. compared to US thyroid 7/2020. \par \par Plan:\par Reviewed blood work and US thyroid. US thyroid before summer. FU 6 months.

## 2021-12-27 NOTE — PHYSICAL EXAM
[Hearing Vargas Test (Tuning Fork On Forehead)] : no lateralization of tone [Midline] : trachea located in midline position [Normal] : no masses and lesions seen, face is symmetric [FreeTextEntry1] : palpable thyroid nodule on right. neck otherwise negative [FreeTextEntry8] : Cerumen removed via curettage.  [FreeTextEntry9] : Cerumen removed via curettage. more than right  [de-identified] : class 1  [FreeTextEntry2] : Sinuses nontender to percussion. Sensations intact.

## 2021-12-27 NOTE — CONSULT LETTER
DNR Bracelet and forms sent to Pt's home for completion.  They will bring back when completed.  Letter sent     [Dear  ___] : Dear  [unfilled], [Courtesy Letter:] : I had the pleasure of seeing your patient, [unfilled], in my office today. [Please see my note below.] : Please see my note below. [Consult Closing:] : Thank you very much for allowing me to participate in the care of this patient.  If you have any questions, please do not hesitate to contact me. [Sincerely,] : Sincerely, [FreeTextEntry3] : Arash Villalpando MD FACS

## 2021-12-27 NOTE — HISTORY OF PRESENT ILLNESS
[de-identified] : The patient presents with h/o thyroiditis, thyroid nodularity, dysphagia and globus sensation with swallowing. Pt presents today for follow up regarding her thyroid and neck. Pt reports doing well. Pt still feels like she has nodules in right side near collarbone. Pt had US thyroid and blood work done in 6/2021.\par

## 2021-12-27 NOTE — ADDENDUM
[FreeTextEntry1] : Documented by Michoacano Baum acting as scribe for Dr. Villalpando on 12/27/2021.\par \par All Medical record entries made by the Scribe were at my, Dr. Villalpando, direction and personally dictated by me on 12/27/2021. I have reviewed the chart and agree that the record accurately reflects my personal performance of the history, physical exam, assessment and plan. I have also personally directed, reviewed, and agreed with the discharge instructions.

## 2022-05-31 ENCOUNTER — RX RENEWAL (OUTPATIENT)
Age: 47
End: 2022-05-31

## 2022-07-01 ENCOUNTER — APPOINTMENT (OUTPATIENT)
Dept: OBGYN | Facility: CLINIC | Age: 47
End: 2022-07-01

## 2022-07-01 VITALS — WEIGHT: 143 LBS | BODY MASS INDEX: 26.16 KG/M2 | DIASTOLIC BLOOD PRESSURE: 83 MMHG | SYSTOLIC BLOOD PRESSURE: 118 MMHG

## 2022-07-01 DIAGNOSIS — H60.90 UNSPECIFIED OTITIS EXTERNA, UNSPECIFIED EAR: ICD-10-CM

## 2022-07-01 DIAGNOSIS — I88.9 NONSPECIFIC LYMPHADENITIS, UNSPECIFIED: ICD-10-CM

## 2022-07-01 DIAGNOSIS — Z87.42 PERSONAL HISTORY OF OTHER DISEASES OF THE FEMALE GENITAL TRACT: ICD-10-CM

## 2022-07-01 DIAGNOSIS — Z01.419 ENCOUNTER FOR GYNECOLOGICAL EXAMINATION (GENERAL) (ROUTINE) W/OUT ABNORMAL FINDINGS: ICD-10-CM

## 2022-07-01 DIAGNOSIS — H60.8X2 OTHER OTITIS EXTERNA, LEFT EAR: ICD-10-CM

## 2022-07-01 DIAGNOSIS — Z87.2 PERSONAL HISTORY OF DISEASES OF THE SKIN AND SUBCUTANEOUS TISSUE: ICD-10-CM

## 2022-07-01 DIAGNOSIS — F32.81 PREMENSTRUAL DYSPHORIC DISORDER: ICD-10-CM

## 2022-07-01 PROCEDURE — 99396 PREV VISIT EST AGE 40-64: CPT

## 2022-07-01 RX ORDER — KETOCONAZOLE 20.5 MG/ML
2 SHAMPOO, SUSPENSION TOPICAL
Qty: 120 | Refills: 0 | Status: COMPLETED | COMMUNITY
Start: 2022-02-09

## 2022-07-01 RX ORDER — CLOBETASOL PROPIONATE 0.5 MG/ML
0.05 SOLUTION TOPICAL
Qty: 50 | Refills: 0 | Status: COMPLETED | COMMUNITY
Start: 2022-02-09

## 2022-07-01 NOTE — HISTORY OF PRESENT ILLNESS
[Patient reported mammogram was normal] : Patient reported mammogram was normal [Patient reported breast sonogram was normal] : Patient reported breast sonogram was normal [Patient reported PAP Smear was normal] : Patient reported PAP Smear was normal [FreeTextEntry1] : 47YO P2 LMP 6/10 on aviane for POF and prozac, no complaints. [Mammogramdate] : 8/22 [BreastSonogramDate] : 8/22 [PapSmeardate] : 2021

## 2022-07-05 LAB — HPV HIGH+LOW RISK DNA PNL CVX: NOT DETECTED

## 2022-07-08 LAB — CYTOLOGY CVX/VAG DOC THIN PREP: NORMAL

## 2022-07-27 ENCOUNTER — APPOINTMENT (OUTPATIENT)
Dept: OTOLARYNGOLOGY | Facility: CLINIC | Age: 47
End: 2022-07-27

## 2022-07-27 VITALS
HEIGHT: 62 IN | WEIGHT: 143 LBS | BODY MASS INDEX: 26.31 KG/M2 | HEART RATE: 90 BPM | DIASTOLIC BLOOD PRESSURE: 78 MMHG | SYSTOLIC BLOOD PRESSURE: 118 MMHG

## 2022-07-27 DIAGNOSIS — Z87.09 PERSONAL HISTORY OF OTHER DISEASES OF THE RESPIRATORY SYSTEM: ICD-10-CM

## 2022-07-27 DIAGNOSIS — E06.9 THYROIDITIS, UNSPECIFIED: ICD-10-CM

## 2022-07-27 PROCEDURE — 99214 OFFICE O/P EST MOD 30 MIN: CPT

## 2022-07-27 NOTE — HISTORY OF PRESENT ILLNESS
[de-identified] : Pt presents with h/o thyroiditis, thyroid nodularity, dysphagia and globus sensation with swallowing. Pt presents today feeling fine. Pt notes her neck feels fine. Pt notes she occasionally feels some lymph nodes. Pt denies change in hearing.

## 2022-07-27 NOTE — ADDENDUM
[FreeTextEntry1] : Documented by Dariela Eng acting as scribe for Dr. Villalpando on 07/27/2022.\par \par All Medical record entries made by the scribe were at my, Dr. Villalpando,direction and personally dictated by me on 07/27/2022. I have reviewed the chart and agree that the record accurately reflects my personal performance of the history, physical exam, assessment and plan. I have also personally directed, reviewed, and agreed with the discharge instructions.

## 2022-07-27 NOTE — ASSESSMENT
[FreeTextEntry1] : Reviewed and reconciled medications, allergies, PMHx, PSHx, SocHx, FMHx.\par \par h/o thyroiditis, thyroid nodularity, dysphagia and globus sensation with swallowing\par cerumen removed b/l\par deviated septum\par mild turb hypertrophy\par can't palpate thyroid nodules\par \par Thyroid Blood work 2/2022\par Antithyroid globulin 76\par thyroid peroxidase 115 - elevated\par \par US thyroid 7/19/22\par right lobe - nodule, stable \par left lobe - nodule, increased in size - not big enough to be concerned\par no lymphadenopathy\par \par Plan:\par Cerumen removed b/l. Discussed US thyroid and blood work results. Discussed r/b/a of needle biopsy. Repeat US thyroid in 6 months. FU 6 months - after US.

## 2022-07-27 NOTE — PHYSICAL EXAM
[Hearing Vargas Test (Tuning Fork On Forehead)] : no lateralization of tone [Midline] : trachea located in midline position [Normal] : orientation to person, place, and time: normal [FreeTextEntry8] : cerumen removed via curettage  [FreeTextEntry9] : cerumen removed via curettage  [FreeTextEntry1] : deviated septum\par mild turb hypertrophy [de-identified] : class 1 [FreeTextEntry2] : sinuses nontender to percussion.

## 2023-01-19 ENCOUNTER — APPOINTMENT (OUTPATIENT)
Dept: ORTHOPEDIC SURGERY | Facility: CLINIC | Age: 48
End: 2023-01-19

## 2023-06-22 ENCOUNTER — RX RENEWAL (OUTPATIENT)
Age: 48
End: 2023-06-22

## 2023-06-22 RX ORDER — LEVONORGESTREL AND ETHINYL ESTRADIOL 0.1-0.02MG
0.1-2 KIT ORAL
Qty: 84 | Refills: 0 | Status: ACTIVE | COMMUNITY
Start: 2022-05-31 | End: 1900-01-01

## 2023-07-07 ENCOUNTER — APPOINTMENT (OUTPATIENT)
Dept: OBGYN | Facility: CLINIC | Age: 48
End: 2023-07-07
Payer: COMMERCIAL

## 2023-07-07 DIAGNOSIS — H60.63 UNSPECIFIED CHRONIC OTITIS EXTERNA, BILATERAL: ICD-10-CM

## 2023-07-07 DIAGNOSIS — N63.0 UNSPECIFIED LUMP IN UNSPECIFIED BREAST: ICD-10-CM

## 2023-07-07 DIAGNOSIS — K42.9 UMBILICAL HERNIA W/OUT OBSTRUCTION OR GANGRENE: ICD-10-CM

## 2023-07-07 DIAGNOSIS — Z87.09 PERSONAL HISTORY OF OTHER DISEASES OF THE RESPIRATORY SYSTEM: ICD-10-CM

## 2023-07-07 DIAGNOSIS — Z01.419 ENCOUNTER FOR GYNECOLOGICAL EXAMINATION (GENERAL) (ROUTINE) W/OUT ABNORMAL FINDINGS: ICD-10-CM

## 2023-07-07 DIAGNOSIS — R55 SYNCOPE AND COLLAPSE: ICD-10-CM

## 2023-07-07 PROCEDURE — 99396 PREV VISIT EST AGE 40-64: CPT

## 2023-07-07 RX ORDER — LEVONORGESTREL AND ETHINYL ESTRADIOL 0.1-0.02MG
0.1-2 KIT ORAL
Qty: 3 | Refills: 3 | Status: ACTIVE | COMMUNITY
Start: 2020-01-30 | End: 1900-01-01

## 2023-07-07 NOTE — HISTORY OF PRESENT ILLNESS
[Patient reported mammogram was normal] : Patient reported mammogram was normal [Patient reported breast sonogram was normal] : Patient reported breast sonogram was normal [Patient reported PAP Smear was normal] : Patient reported PAP Smear was normal [FreeTextEntry1] : 48YO P2 LMP 1 month ago on Aviane for POF, noting bizarre dreams at night, seem to be increasing in frequency. [Mammogramdate] : 8/22 [BreastSonogramDate] : 8/22 [PapSmeardate] : 7/22

## 2023-07-10 LAB — HPV HIGH+LOW RISK DNA PNL CVX: NOT DETECTED

## 2023-07-11 LAB — CYTOLOGY CVX/VAG DOC THIN PREP: NORMAL

## 2023-10-25 ENCOUNTER — NON-APPOINTMENT (OUTPATIENT)
Age: 48
End: 2023-10-25

## 2023-12-08 NOTE — PROCEDURE
Patient called and left a message. I called patient back and asked him to call the office back.    ED with fever, cough, runny nose,- body aches, -sore throat x  days. Pt recently exposed to COVID-19. Pt here for testing.  no sob or lim [Cervical Pap Smear] : cervical Pap smear [Liquid Base] : liquid base [Tolerated Well] : the patient tolerated the procedure well [No Complications] : there were no complications

## 2023-12-18 ENCOUNTER — APPOINTMENT (OUTPATIENT)
Dept: OTOLARYNGOLOGY | Facility: CLINIC | Age: 48
End: 2023-12-18
Payer: COMMERCIAL

## 2023-12-18 ENCOUNTER — RESULT REVIEW (OUTPATIENT)
Age: 48
End: 2023-12-18

## 2023-12-18 VITALS
HEIGHT: 62 IN | BODY MASS INDEX: 26.31 KG/M2 | SYSTOLIC BLOOD PRESSURE: 126 MMHG | DIASTOLIC BLOOD PRESSURE: 81 MMHG | WEIGHT: 143 LBS | HEART RATE: 94 BPM

## 2023-12-18 DIAGNOSIS — J32.2 CHRONIC ETHMOIDAL SINUSITIS: ICD-10-CM

## 2023-12-18 DIAGNOSIS — H60.8X3 OTHER OTITIS EXTERNA, BILATERAL: ICD-10-CM

## 2023-12-18 DIAGNOSIS — J32.0 CHRONIC MAXILLARY SINUSITIS: ICD-10-CM

## 2023-12-18 DIAGNOSIS — E04.2 NONTOXIC MULTINODULAR GOITER: ICD-10-CM

## 2023-12-18 DIAGNOSIS — J34.2 DEVIATED NASAL SEPTUM: ICD-10-CM

## 2023-12-18 DIAGNOSIS — J31.0 CHRONIC RHINITIS: ICD-10-CM

## 2023-12-18 DIAGNOSIS — G50.1 ATYPICAL FACIAL PAIN: ICD-10-CM

## 2023-12-18 DIAGNOSIS — R44.8 OTHER SYMPTOMS AND SIGNS INVOLVING GENERAL SENSATIONS AND PERCEPTIONS: ICD-10-CM

## 2023-12-18 DIAGNOSIS — H90.3 SENSORINEURAL HEARING LOSS, BILATERAL: ICD-10-CM

## 2023-12-18 DIAGNOSIS — R42 DIZZINESS AND GIDDINESS: ICD-10-CM

## 2023-12-18 PROCEDURE — 31231 NASAL ENDOSCOPY DX: CPT

## 2023-12-18 PROCEDURE — 99214 OFFICE O/P EST MOD 30 MIN: CPT | Mod: 25

## 2023-12-18 PROCEDURE — 70486 CT MAXILLOFACIAL W/O DYE: CPT

## 2023-12-18 PROCEDURE — 92570 ACOUSTIC IMMITANCE TESTING: CPT

## 2023-12-18 PROCEDURE — 92557 COMPREHENSIVE HEARING TEST: CPT

## 2023-12-18 RX ORDER — FLUTICASONE PROPIONATE 50 UG/1
50 SPRAY, METERED NASAL
Qty: 3 | Refills: 3 | Status: ACTIVE | COMMUNITY
Start: 2023-12-18 | End: 1900-01-01

## 2023-12-18 NOTE — HISTORY OF PRESENT ILLNESS
[de-identified] : Pt. with h/o thyroiditis, thyroid nodularity, dysphagia, and globus sensation with swallowing presents today stating she has a cold and feels like it is mostly on the left side. States she felt dizzy on Saturday. She adds that she been getting colds a lot these past few months. 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 Patient states she has been having problems with left TMJ as well.

## 2023-12-18 NOTE — ASSESSMENT
[FreeTextEntry1] : Reviewed and reconciled medications, allergies, PMHx, PSHx, SocHx, FMHx   Pt. with h/o thyroiditis, thyroid nodularity, dysphagia, and globus sensation with swallowing presents today stating she has a cold and feels like it is mostly on the left side. States she felt dizzy on Saturday. She adds that she been getting colds a lot these past few months. 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 Patient states she has been having problems with left TMJ as well.   Physical Exam: -right ear: minimal cerumen removed via curettage  -left ear: cerumen removed via curettage  - mildly inflamed turbinates  -tonsils class 1 -o nystagmus -horizontal head roll: negative -vertical head rolll: negative -romber: negative. Pretty steady  Flexible Nasal Eendoscopy: Left Side: -middle meatus clear -no drainage or discharge -deviated septum against inferior turbinate Right Side: -middle meatus clear -more open  Audio: -right ear: 100% at 45 dB -left ear: 100% at 60 dB type A tymps AU ETF WNL AU hearing essentially wnl 250-8000 Hz AU *LF asymmetry 250-1000 Hz AS  MiniCAT:  -thickening left maxillary -thickening in left ethmoid -tiny left frontal -left sphenoid is okay -left turbinate hypertrophy  Plan: Repeat thyroid sono in March.  Audio - results interpreted by Dr. Villalpando and reviewed with the patient. Mini CT sinus - interpreted by Dr. Villalpando and reviewed with the patient, pending radiologist review. Start Flonase - 2 sprays bilaterally QD, spray laterally. Afrin 30 minutes before flight. ABR and eCOG ordered. FU after tests. R/B/A of sinus surgery discussed wth patient

## 2023-12-18 NOTE — PHYSICAL EXAM
[Hearing Vargas Test (Tuning Fork On Forehead)] : no lateralization of tone [Midline] : trachea located in midline position [Normal] : no rashes [de-identified] : TMJ not tender at the moment [FreeTextEntry8] : minimal cerumen removed via curettage  [FreeTextEntry9] : cerumen removed via curettage  [de-identified] : mildly inflamed turbinates  [de-identified] : class 1 [] : Romberg test is negative [FreeTextEntry2] : sensations intact. sinuses nontender to percussion  [de-identified] : SHARMIN [de-identified] : negtive romberg. Pretty steady

## 2023-12-18 NOTE — PROCEDURE
[Recalcitrant Symptoms] : recalcitrant symptoms  [FreeTextEntry6] : Flexible Nasal Endoscopy: Left Side: -middle meatus clear -no drainage or discharge -deviated septum against inferior turbinate Right Side: -middle meatus clear -more open

## 2023-12-18 NOTE — DATA REVIEWED
[de-identified] : type A tymps AU ETF WNL AU hearing essentially wnl 250-8000 Hz AU *LF asymmetry 250-1000 Hz AS

## 2024-01-16 ENCOUNTER — APPOINTMENT (OUTPATIENT)
Dept: OTOLARYNGOLOGY | Facility: CLINIC | Age: 49
End: 2024-01-16

## 2024-01-23 ENCOUNTER — APPOINTMENT (OUTPATIENT)
Dept: OTOLARYNGOLOGY | Facility: CLINIC | Age: 49
End: 2024-01-23
Payer: COMMERCIAL

## 2024-01-23 PROCEDURE — 92653 AEP NEURODIAGNOSTIC I&R: CPT

## 2024-01-23 PROCEDURE — 92584 ELECTROCOCHLEOGRAPHY: CPT

## 2024-01-26 ENCOUNTER — NON-APPOINTMENT (OUTPATIENT)
Age: 49
End: 2024-01-26

## 2024-01-29 ENCOUNTER — NON-APPOINTMENT (OUTPATIENT)
Age: 49
End: 2024-01-29

## 2024-07-19 ENCOUNTER — RX RENEWAL (OUTPATIENT)
Age: 49
End: 2024-07-19

## 2024-07-22 ENCOUNTER — APPOINTMENT (OUTPATIENT)
Dept: OBGYN | Facility: CLINIC | Age: 49
End: 2024-07-22
Payer: COMMERCIAL

## 2024-07-22 VITALS — BODY MASS INDEX: 27.07 KG/M2 | SYSTOLIC BLOOD PRESSURE: 128 MMHG | DIASTOLIC BLOOD PRESSURE: 79 MMHG | WEIGHT: 148 LBS

## 2024-07-22 DIAGNOSIS — G50.1 ATYPICAL FACIAL PAIN: ICD-10-CM

## 2024-07-22 DIAGNOSIS — R42 DIZZINESS AND GIDDINESS: ICD-10-CM

## 2024-07-22 DIAGNOSIS — R44.8 OTHER SYMPTOMS AND SIGNS INVOLVING GENERAL SENSATIONS AND PERCEPTIONS: ICD-10-CM

## 2024-07-22 DIAGNOSIS — Z01.419 ENCOUNTER FOR GYNECOLOGICAL EXAMINATION (GENERAL) (ROUTINE) W/OUT ABNORMAL FINDINGS: ICD-10-CM

## 2024-07-22 DIAGNOSIS — R19.4 CHANGE IN BOWEL HABIT: ICD-10-CM

## 2024-07-22 PROCEDURE — 99396 PREV VISIT EST AGE 40-64: CPT

## 2024-07-22 PROCEDURE — 99459 PELVIC EXAMINATION: CPT

## 2024-07-22 RX ORDER — ROSUVASTATIN CALCIUM 10 MG/1
10 TABLET, FILM COATED ORAL
Qty: 90 | Refills: 0 | Status: ACTIVE | COMMUNITY
Start: 2024-07-01

## 2024-07-22 RX ORDER — OSELTAMIVIR PHOSPHATE 75 MG/1
75 CAPSULE ORAL
Qty: 10 | Refills: 0 | Status: COMPLETED | COMMUNITY
Start: 2024-01-27

## 2024-07-22 RX ORDER — SODIUM FLUORIDE 6.1 MG/ML
1.1 PASTE, DENTIFRICE DENTAL
Qty: 100 | Refills: 0 | Status: COMPLETED | COMMUNITY
Start: 2024-02-22

## 2024-07-22 NOTE — PHYSICAL EXAM
[Chaperone Present] : A chaperone was present in the examining room during all aspects of the physical examination [[id number]] : A chaperone was present during the pelvic exam. [FreeTextEntry2] : Azam [Appropriately responsive] : appropriately responsive [Alert] : alert [No Acute Distress] : no acute distress [No Lymphadenopathy] : no lymphadenopathy [Regular Rate Rhythm] : regular rate rhythm [No Murmurs] : no murmurs [Clear to Auscultation B/L] : clear to auscultation bilaterally [Soft] : soft [Non-tender] : non-tender [Non-distended] : non-distended [No HSM] : No HSM [No Lesions] : no lesions [No Mass] : no mass [Oriented x3] : oriented x3 [Examination Of The Breasts] : a normal appearance [No Masses] : no breast masses were palpable [Labia Majora] : normal [Labia Minora] : normal [Pink Rugae] : pink rugae [No Bleeding] : There was no active vaginal bleeding [Normal] : normal [Normal Position] : in a normal position [Tenderness] : nontender [Uterine Adnexae] : normal

## 2024-07-22 NOTE — HISTORY OF PRESENT ILLNESS
[Patient reported breast sonogram was normal] : Patient reported breast sonogram was normal [Patient reported PAP Smear was normal] : Patient reported PAP Smear was normal [Patient reported colonoscopy was normal] : Patient reported colonoscopy was normal [FreeTextEntry1] : 49YO P2 LMP 7/7 on Aviane for POF, still with bizarre dreams and hot flashing during placebo week. [Mammogramdate] : 8/23 [BreastSonogramDate] : 8/23 [PapSmeardate] : 2023 [ColonoscopyDate] : 9/23

## 2024-07-22 NOTE — PLAN
Health Care Proxy (HCP) [FreeTextEntry1] : Try not taking placebo with Ja to see how flashes are and bizarre dreams

## 2024-07-25 LAB — HPV HIGH+LOW RISK DNA PNL CVX: NOT DETECTED

## 2024-07-26 LAB — CYTOLOGY CVX/VAG DOC THIN PREP: NORMAL

## 2024-08-22 NOTE — ED ADULT NURSE NOTE - CCCP TRG CHIEF CMPLNT
Quality 226: Preventive Care And Screening: Tobacco Use: Screening And Cessation Intervention: Patient screened for tobacco use and is an ex/non-smoker Quality 47: Advance Care Plan: Advance Care Planning discussed and documented in the medical record; patient did not wish or was not able to name a surrogate decision maker or provide an advance care plan. Detail Level: Detailed Quality 431: Preventive Care And Screening: Unhealthy Alcohol Use - Screening: Patient not identified as an unhealthy alcohol user when screened for unhealthy alcohol use using a systematic screening method Quality 130: Documentation Of Current Medications In The Medical Record: Current Medications Documented psychiatric evaluation

## 2024-09-27 ENCOUNTER — TRANSCRIPTION ENCOUNTER (OUTPATIENT)
Age: 49
End: 2024-09-27

## 2024-09-30 ENCOUNTER — TRANSCRIPTION ENCOUNTER (OUTPATIENT)
Age: 49
End: 2024-09-30

## 2025-01-28 ENCOUNTER — APPOINTMENT (OUTPATIENT)
Dept: OTOLARYNGOLOGY | Facility: CLINIC | Age: 50
End: 2025-01-28
Payer: COMMERCIAL

## 2025-01-28 VITALS
HEIGHT: 62 IN | BODY MASS INDEX: 27.97 KG/M2 | WEIGHT: 152 LBS | SYSTOLIC BLOOD PRESSURE: 125 MMHG | DIASTOLIC BLOOD PRESSURE: 82 MMHG | HEART RATE: 86 BPM

## 2025-01-28 DIAGNOSIS — E04.2 NONTOXIC MULTINODULAR GOITER: ICD-10-CM

## 2025-01-28 DIAGNOSIS — R04.0 EPISTAXIS: ICD-10-CM

## 2025-01-28 DIAGNOSIS — H90.3 SENSORINEURAL HEARING LOSS, BILATERAL: ICD-10-CM

## 2025-01-28 DIAGNOSIS — J31.0 CHRONIC RHINITIS: ICD-10-CM

## 2025-01-28 DIAGNOSIS — H60.63 UNSPECIFIED CHRONIC OTITIS EXTERNA, BILATERAL: ICD-10-CM

## 2025-01-28 DIAGNOSIS — J34.2 DEVIATED NASAL SEPTUM: ICD-10-CM

## 2025-01-28 PROCEDURE — 92557 COMPREHENSIVE HEARING TEST: CPT

## 2025-01-28 PROCEDURE — 31231 NASAL ENDOSCOPY DX: CPT

## 2025-01-28 PROCEDURE — 92570 ACOUSTIC IMMITANCE TESTING: CPT

## 2025-01-28 PROCEDURE — 99214 OFFICE O/P EST MOD 30 MIN: CPT | Mod: 25

## 2025-02-01 ENCOUNTER — NON-APPOINTMENT (OUTPATIENT)
Age: 50
End: 2025-02-01

## 2025-08-06 ENCOUNTER — NON-APPOINTMENT (OUTPATIENT)
Age: 50
End: 2025-08-06

## 2025-08-07 ENCOUNTER — NON-APPOINTMENT (OUTPATIENT)
Age: 50
End: 2025-08-07

## 2025-08-08 ENCOUNTER — APPOINTMENT (OUTPATIENT)
Dept: OBGYN | Facility: CLINIC | Age: 50
End: 2025-08-08
Payer: COMMERCIAL

## 2025-08-08 VITALS — WEIGHT: 153 LBS | BODY MASS INDEX: 27.98 KG/M2 | DIASTOLIC BLOOD PRESSURE: 76 MMHG | SYSTOLIC BLOOD PRESSURE: 122 MMHG

## 2025-08-08 DIAGNOSIS — Z01.419 ENCOUNTER FOR GYNECOLOGICAL EXAMINATION (GENERAL) (ROUTINE) W/OUT ABNORMAL FINDINGS: ICD-10-CM

## 2025-08-08 DIAGNOSIS — R92.30 DENSE BREASTS, UNSPECIFIED: ICD-10-CM

## 2025-08-08 DIAGNOSIS — E28.39 OTHER PRIMARY OVARIAN FAILURE: ICD-10-CM

## 2025-08-08 PROCEDURE — 99459 PELVIC EXAMINATION: CPT | Mod: NC

## 2025-08-08 PROCEDURE — G0444 DEPRESSION SCREEN ANNUAL: CPT | Mod: 59

## 2025-08-08 PROCEDURE — 99396 PREV VISIT EST AGE 40-64: CPT
